# Patient Record
Sex: FEMALE | Race: WHITE | URBAN - METROPOLITAN AREA
[De-identification: names, ages, dates, MRNs, and addresses within clinical notes are randomized per-mention and may not be internally consistent; named-entity substitution may affect disease eponyms.]

---

## 2017-12-12 ENCOUNTER — OUTPATIENT (OUTPATIENT)
Dept: OUTPATIENT SERVICES | Facility: HOSPITAL | Age: 22
LOS: 1 days | Discharge: HOME | End: 2017-12-12

## 2017-12-12 DIAGNOSIS — Z00.00 ENCOUNTER FOR GENERAL ADULT MEDICAL EXAMINATION WITHOUT ABNORMAL FINDINGS: ICD-10-CM

## 2018-07-21 ENCOUNTER — OUTPATIENT (OUTPATIENT)
Dept: OUTPATIENT SERVICES | Facility: HOSPITAL | Age: 23
LOS: 1 days | Discharge: HOME | End: 2018-07-21

## 2018-07-21 DIAGNOSIS — N63.20 UNSPECIFIED LUMP IN THE LEFT BREAST, UNSPECIFIED QUADRANT: ICD-10-CM

## 2018-10-02 PROBLEM — Z00.00 ENCOUNTER FOR PREVENTIVE HEALTH EXAMINATION: Status: ACTIVE | Noted: 2018-10-02

## 2018-10-16 ENCOUNTER — APPOINTMENT (OUTPATIENT)
Dept: SURGERY | Facility: CLINIC | Age: 23
End: 2018-10-16
Payer: COMMERCIAL

## 2018-10-16 VITALS
SYSTOLIC BLOOD PRESSURE: 136 MMHG | WEIGHT: 176 LBS | HEIGHT: 67 IN | BODY MASS INDEX: 27.62 KG/M2 | DIASTOLIC BLOOD PRESSURE: 80 MMHG

## 2018-10-16 DIAGNOSIS — Z86.39 PERSONAL HISTORY OF OTHER ENDOCRINE, NUTRITIONAL AND METABOLIC DISEASE: ICD-10-CM

## 2018-10-16 DIAGNOSIS — Z83.3 FAMILY HISTORY OF DIABETES MELLITUS: ICD-10-CM

## 2018-10-16 DIAGNOSIS — Z78.9 OTHER SPECIFIED HEALTH STATUS: ICD-10-CM

## 2018-10-16 PROCEDURE — 99202 OFFICE O/P NEW SF 15 MIN: CPT

## 2018-10-19 PROBLEM — Z78.9 CAFFEINE USE: Status: ACTIVE | Noted: 2018-10-16

## 2018-10-19 PROBLEM — Z86.39 HISTORY OF HYPERTHYROIDISM: Status: RESOLVED | Noted: 2018-10-16 | Resolved: 2018-10-19

## 2018-10-19 PROBLEM — Z86.39 HISTORY OF DIABETES MELLITUS: Status: RESOLVED | Noted: 2018-10-16 | Resolved: 2018-10-19

## 2018-10-19 PROBLEM — Z83.3 FAMILY HISTORY OF DIABETES MELLITUS: Status: ACTIVE | Noted: 2018-10-16

## 2018-10-19 RX ORDER — PROPRANOLOL HYDROCHLORIDE 10 MG/1
10 TABLET ORAL
Refills: 0 | Status: ACTIVE | COMMUNITY

## 2018-10-19 RX ORDER — FINASTERIDE 5 MG/1
5 TABLET, FILM COATED ORAL
Refills: 0 | Status: ACTIVE | COMMUNITY

## 2018-10-19 RX ORDER — PREDNISONE 10 MG/1
10 TABLET ORAL
Refills: 0 | Status: ACTIVE | COMMUNITY

## 2018-10-19 RX ORDER — METHIMAZOLE 10 MG/1
10 TABLET ORAL
Refills: 0 | Status: ACTIVE | COMMUNITY

## 2018-10-19 RX ORDER — METFORMIN HYDROCHLORIDE 500 MG/1
500 TABLET, COATED ORAL
Refills: 0 | Status: ACTIVE | COMMUNITY

## 2018-10-23 ENCOUNTER — OUTPATIENT (OUTPATIENT)
Dept: OUTPATIENT SERVICES | Facility: HOSPITAL | Age: 23
LOS: 1 days | Discharge: HOME | End: 2018-10-23

## 2018-10-23 ENCOUNTER — MESSAGE (OUTPATIENT)
Age: 23
End: 2018-10-23

## 2018-10-23 DIAGNOSIS — N63.20 UNSPECIFIED LUMP IN THE LEFT BREAST, UNSPECIFIED QUADRANT: ICD-10-CM

## 2018-10-29 ENCOUNTER — OUTPATIENT (OUTPATIENT)
Dept: OUTPATIENT SERVICES | Facility: HOSPITAL | Age: 23
LOS: 1 days | Discharge: HOME | End: 2018-10-29

## 2018-10-29 DIAGNOSIS — R07.81 PLEURODYNIA: ICD-10-CM

## 2018-12-04 ENCOUNTER — APPOINTMENT (OUTPATIENT)
Dept: SURGERY | Facility: CLINIC | Age: 23
End: 2018-12-04
Payer: COMMERCIAL

## 2018-12-04 VITALS
BODY MASS INDEX: 28.09 KG/M2 | DIASTOLIC BLOOD PRESSURE: 70 MMHG | SYSTOLIC BLOOD PRESSURE: 120 MMHG | WEIGHT: 179 LBS | HEIGHT: 67 IN

## 2018-12-04 DIAGNOSIS — N63.20 UNSPECIFIED LUMP IN THE LEFT BREAST, UNSPECIFIED QUADRANT: ICD-10-CM

## 2018-12-04 PROCEDURE — 99213 OFFICE O/P EST LOW 20 MIN: CPT

## 2018-12-05 ENCOUNTER — APPOINTMENT (OUTPATIENT)
Dept: OTOLARYNGOLOGY | Facility: CLINIC | Age: 23
End: 2018-12-05
Payer: COMMERCIAL

## 2018-12-05 PROCEDURE — 99245 OFF/OP CONSLTJ NEW/EST HI 55: CPT

## 2018-12-10 PROBLEM — N63.20 LEFT BREAST LUMP: Status: ACTIVE | Noted: 2018-10-16

## 2019-03-19 ENCOUNTER — APPOINTMENT (OUTPATIENT)
Dept: OTOLARYNGOLOGY | Facility: HOSPITAL | Age: 24
End: 2019-03-19

## 2019-04-25 ENCOUNTER — OUTPATIENT (OUTPATIENT)
Dept: OUTPATIENT SERVICES | Facility: HOSPITAL | Age: 24
LOS: 1 days | Discharge: HOME | End: 2019-04-25
Payer: COMMERCIAL

## 2019-04-25 DIAGNOSIS — E06.3 AUTOIMMUNE THYROIDITIS: ICD-10-CM

## 2019-04-25 PROCEDURE — 76536 US EXAM OF HEAD AND NECK: CPT | Mod: 26

## 2020-07-29 ENCOUNTER — OUTPATIENT (OUTPATIENT)
Dept: OUTPATIENT SERVICES | Facility: HOSPITAL | Age: 25
LOS: 1 days | Discharge: HOME | End: 2020-07-29
Payer: COMMERCIAL

## 2020-07-29 DIAGNOSIS — N63.20 UNSPECIFIED LUMP IN THE LEFT BREAST, UNSPECIFIED QUADRANT: ICD-10-CM

## 2020-07-29 PROCEDURE — 76642 ULTRASOUND BREAST LIMITED: CPT | Mod: 26,LT

## 2021-07-14 ENCOUNTER — OUTPATIENT (OUTPATIENT)
Dept: OUTPATIENT SERVICES | Facility: HOSPITAL | Age: 26
LOS: 1 days | Discharge: HOME | End: 2021-07-14
Payer: COMMERCIAL

## 2021-07-14 DIAGNOSIS — E06.3 AUTOIMMUNE THYROIDITIS: ICD-10-CM

## 2021-07-14 PROCEDURE — 76536 US EXAM OF HEAD AND NECK: CPT | Mod: 26

## 2021-08-11 ENCOUNTER — TRANSCRIPTION ENCOUNTER (OUTPATIENT)
Age: 26
End: 2021-08-11

## 2021-09-29 ENCOUNTER — APPOINTMENT (OUTPATIENT)
Dept: OBGYN | Facility: CLINIC | Age: 26
End: 2021-09-29
Payer: COMMERCIAL

## 2021-09-29 VITALS
HEART RATE: 78 BPM | DIASTOLIC BLOOD PRESSURE: 82 MMHG | HEIGHT: 67 IN | TEMPERATURE: 97.9 F | WEIGHT: 200 LBS | SYSTOLIC BLOOD PRESSURE: 128 MMHG | BODY MASS INDEX: 31.39 KG/M2

## 2021-09-29 DIAGNOSIS — Z01.419 ENCOUNTER FOR GYNECOLOGICAL EXAMINATION (GENERAL) (ROUTINE) W/OUT ABNORMAL FINDINGS: ICD-10-CM

## 2021-09-29 DIAGNOSIS — Z78.9 OTHER SPECIFIED HEALTH STATUS: ICD-10-CM

## 2021-09-29 DIAGNOSIS — Z86.39 PERSONAL HISTORY OF OTHER ENDOCRINE, NUTRITIONAL AND METABOLIC DISEASE: ICD-10-CM

## 2021-09-29 DIAGNOSIS — Z86.018 PERSONAL HISTORY OF OTHER BENIGN NEOPLASM: ICD-10-CM

## 2021-09-29 DIAGNOSIS — Z82.49 FAMILY HISTORY OF ISCHEMIC HEART DISEASE AND OTHER DISEASES OF THE CIRCULATORY SYSTEM: ICD-10-CM

## 2021-09-29 PROCEDURE — 99395 PREV VISIT EST AGE 18-39: CPT

## 2021-09-29 NOTE — PHYSICAL EXAM
[Appropriately responsive] : appropriately responsive [Alert] : alert [No Acute Distress] : no acute distress [No Lymphadenopathy] : no lymphadenopathy [Soft] : soft [Non-tender] : non-tender [Non-distended] : non-distended [No HSM] : No HSM [No Lesions] : no lesions [No Mass] : no mass [Oriented x3] : oriented x3 [Examination Of The Breasts] : a normal appearance [No Discharge] : no discharge [No Masses] : no breast masses were palpable [Labia Majora] : normal [Labia Minora] : normal [No Bleeding] : There was no active vaginal bleeding [Normal] : normal [Uterine Adnexae] : normal [FreeTextEntry6] : limited single digit exam , patient is a virgin , pediatric speculum used, but poor visualization still

## 2021-09-29 NOTE — DISCUSSION/SUMMARY
[FreeTextEntry1] : 26 yo p0 for annual Exam, menorrhagia, dysmenorrhea\par pap hpv\par  pan meds prn \par breast surgeon  after excision advised no f/up needed in 2021 \par (  [facility] )\par \par

## 2021-09-29 NOTE — HISTORY OF PRESENT ILLNESS
[Patient reported PAP Smear was normal] : Patient reported PAP Smear was normal [Y] : Patient reports abnormal menses [N] : Patient is not sexually active [Menarche Age: ____] : age at menarche was [unfilled] [No] : Patient does not have concerns regarding sex [Never active] : never active [TextBox_4] : gynhx\par  no fibroids\par no cyst , no std \par Gardasil vaccine\par 13/reg/7\par \par  [PapSmeardate] : 2019 [MensesFreq] : 28 [MensesLength] : 7 [PGHxTotal] : 0 [FreeTextEntry1] : 9-6-2026

## 2021-09-30 ENCOUNTER — TRANSCRIPTION ENCOUNTER (OUTPATIENT)
Age: 26
End: 2021-09-30

## 2021-10-05 LAB — HPV HIGH+LOW RISK DNA PNL CVX: NOT DETECTED

## 2021-10-08 LAB — CYTOLOGY CVX/VAG DOC THIN PREP: NORMAL

## 2021-10-20 ENCOUNTER — OUTPATIENT (OUTPATIENT)
Dept: OUTPATIENT SERVICES | Facility: HOSPITAL | Age: 26
LOS: 1 days | Discharge: HOME | End: 2021-10-20
Payer: COMMERCIAL

## 2021-10-20 DIAGNOSIS — R63.5 ABNORMAL WEIGHT GAIN: ICD-10-CM

## 2021-10-20 PROCEDURE — 76700 US EXAM ABDOM COMPLETE: CPT | Mod: 26

## 2022-07-13 ENCOUNTER — APPOINTMENT (OUTPATIENT)
Dept: OBGYN | Facility: CLINIC | Age: 27
End: 2022-07-13

## 2022-07-13 VITALS
TEMPERATURE: 98.1 F | DIASTOLIC BLOOD PRESSURE: 80 MMHG | HEART RATE: 78 BPM | HEIGHT: 67 IN | BODY MASS INDEX: 29.03 KG/M2 | SYSTOLIC BLOOD PRESSURE: 118 MMHG | WEIGHT: 185 LBS

## 2022-07-13 DIAGNOSIS — N89.8 OTHER SPECIFIED NONINFLAMMATORY DISORDERS OF VAGINA: ICD-10-CM

## 2022-07-13 DIAGNOSIS — R35.0 FREQUENCY OF MICTURITION: ICD-10-CM

## 2022-07-13 LAB
BILIRUB UR QL STRIP: NORMAL
CLARITY UR: CLEAR
COLLECTION METHOD: NORMAL
GLUCOSE UR-MCNC: NORMAL
HCG UR QL: 0.2 EU/DL
HGB UR QL STRIP.AUTO: NORMAL
KETONES UR-MCNC: ABNORMAL
LEUKOCYTE ESTERASE UR QL STRIP: ABNORMAL
NITRITE UR QL STRIP: NORMAL
PH UR STRIP: 5.5
PROT UR STRIP-MCNC: NORMAL
SP GR UR STRIP: 1.03

## 2022-07-13 PROCEDURE — 99213 OFFICE O/P EST LOW 20 MIN: CPT | Mod: 25

## 2022-07-13 PROCEDURE — 81003 URINALYSIS AUTO W/O SCOPE: CPT | Mod: QW

## 2022-07-13 RX ORDER — METRONIDAZOLE 7.5 MG/G
0.75 GEL VAGINAL
Qty: 1 | Refills: 1 | Status: ACTIVE | COMMUNITY
Start: 2022-07-13 | End: 1900-01-01

## 2022-07-13 RX ORDER — FLUCONAZOLE 150 MG/1
150 TABLET ORAL DAILY
Qty: 2 | Refills: 1 | Status: ACTIVE | COMMUNITY
Start: 2022-07-13 | End: 1900-01-01

## 2022-07-13 NOTE — DISCUSSION/SUMMARY
[FreeTextEntry1] : 25 yo p0 r/o BV and yeast infection, r/o UTI\par vag cx\par gc/chl\par urcx\par MetroGel 5 days\par Fluconazole 150 po\par

## 2022-07-13 NOTE — HISTORY OF PRESENT ILLNESS
[FreeTextEntry1] : 25 yo p0 lmp 7/8 Patient is here for evaluation vaginal burning ,  discharge , vaginal discomfort for last 5-6 days.\par Some discomfort with urination on and off, no blood in the urine or fever, some urinary frequency.\par \par Patient is on metformin and losing weight.  She is off propranolol, thyroid is stable.\par Patient does not want birth control due to breast mass that is being monitored.

## 2022-07-13 NOTE — PHYSICAL EXAM
[Labia Majora] : normal [Labia Minora] : normal [Normal] : normal [Uterine Adnexae] : normal [Discharge] : a  ~M vaginal discharge was present [Moderate] : moderate [Juan Manuel] : yellow [Curds] : curd-like

## 2022-07-18 LAB
A VAGINAE DNA VAG QL NAA+PROBE: NORMAL
BVAB2 DNA VAG QL NAA+PROBE: NORMAL
C KRUSEI DNA VAG QL NAA+PROBE: NEGATIVE
C KRUSEI DNA VAG QL NAA+PROBE: POSITIVE
MEGA1 DNA VAG QL NAA+PROBE: NORMAL
T VAGINALIS RRNA SPEC QL NAA+PROBE: NEGATIVE

## 2023-04-29 ENCOUNTER — EMERGENCY (EMERGENCY)
Facility: HOSPITAL | Age: 28
LOS: 0 days | Discharge: HOME | End: 2023-04-29
Attending: EMERGENCY MEDICINE
Payer: COMMERCIAL

## 2023-04-29 VITALS
HEIGHT: 67 IN | WEIGHT: 179.02 LBS | TEMPERATURE: 98 F | DIASTOLIC BLOOD PRESSURE: 77 MMHG | OXYGEN SATURATION: 98 % | SYSTOLIC BLOOD PRESSURE: 125 MMHG | RESPIRATION RATE: 18 BRPM | HEART RATE: 73 BPM

## 2023-04-29 VITALS
SYSTOLIC BLOOD PRESSURE: 125 MMHG | DIASTOLIC BLOOD PRESSURE: 80 MMHG | HEART RATE: 64 BPM | TEMPERATURE: 97 F | RESPIRATION RATE: 18 BRPM | OXYGEN SATURATION: 98 %

## 2023-04-29 DIAGNOSIS — R55 SYNCOPE AND COLLAPSE: ICD-10-CM
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
## 2023-04-29 DIAGNOSIS — S09.90XA UNSPECIFIED INJURY OF HEAD, INITIAL ENCOUNTER: ICD-10-CM
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
## 2023-04-29 DIAGNOSIS — R42 DIZZINESS AND GIDDINESS: ICD-10-CM
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
## 2023-04-29 DIAGNOSIS — S01.01XA LACERATION WITHOUT FOREIGN BODY OF SCALP, INITIAL ENCOUNTER: ICD-10-CM

## 2023-04-29 DIAGNOSIS — E05.90 THYROTOXICOSIS, UNSPECIFIED WITHOUT THYROTOXIC CRISIS OR STORM: ICD-10-CM

## 2023-04-29 DIAGNOSIS — R51.9 HEADACHE, UNSPECIFIED: ICD-10-CM

## 2023-04-29 DIAGNOSIS — E03.9 HYPOTHYROIDISM, UNSPECIFIED: ICD-10-CM

## 2023-04-29 LAB
ALBUMIN SERPL ELPH-MCNC: 4.8 G/DL — SIGNIFICANT CHANGE UP (ref 3.5–5.2)
ALP SERPL-CCNC: 66 U/L — SIGNIFICANT CHANGE UP (ref 30–115)
ALT FLD-CCNC: 7 U/L — SIGNIFICANT CHANGE UP (ref 0–41)
ANION GAP SERPL CALC-SCNC: 12 MMOL/L — SIGNIFICANT CHANGE UP (ref 7–14)
AST SERPL-CCNC: 10 U/L — SIGNIFICANT CHANGE UP (ref 0–41)
BASOPHILS # BLD AUTO: 0.14 K/UL — SIGNIFICANT CHANGE UP (ref 0–0.2)
BASOPHILS NFR BLD AUTO: 0.8 % — SIGNIFICANT CHANGE UP (ref 0–1)
BILIRUB SERPL-MCNC: 0.4 MG/DL — SIGNIFICANT CHANGE UP (ref 0.2–1.2)
BUN SERPL-MCNC: 19 MG/DL — SIGNIFICANT CHANGE UP (ref 10–20)
CALCIUM SERPL-MCNC: 10 MG/DL — SIGNIFICANT CHANGE UP (ref 8.4–10.4)
CHLORIDE SERPL-SCNC: 105 MMOL/L — SIGNIFICANT CHANGE UP (ref 98–110)
CO2 SERPL-SCNC: 26 MMOL/L — SIGNIFICANT CHANGE UP (ref 17–32)
CREAT SERPL-MCNC: 0.9 MG/DL — SIGNIFICANT CHANGE UP (ref 0.7–1.5)
EGFR: 91 ML/MIN/1.73M2 — SIGNIFICANT CHANGE UP
EOSINOPHIL # BLD AUTO: 0.38 K/UL — SIGNIFICANT CHANGE UP (ref 0–0.7)
EOSINOPHIL NFR BLD AUTO: 2.1 % — SIGNIFICANT CHANGE UP (ref 0–8)
GLUCOSE SERPL-MCNC: 79 MG/DL — SIGNIFICANT CHANGE UP (ref 70–99)
HCT VFR BLD CALC: 41.8 % — SIGNIFICANT CHANGE UP (ref 37–47)
HGB BLD-MCNC: 13.6 G/DL — SIGNIFICANT CHANGE UP (ref 12–16)
IMM GRANULOCYTES NFR BLD AUTO: 0.8 % — HIGH (ref 0.1–0.3)
LYMPHOCYTES # BLD AUTO: 27.4 % — SIGNIFICANT CHANGE UP (ref 20.5–51.1)
LYMPHOCYTES # BLD AUTO: 4.91 K/UL — HIGH (ref 1.2–3.4)
MCHC RBC-ENTMCNC: 27.1 PG — SIGNIFICANT CHANGE UP (ref 27–31)
MCHC RBC-ENTMCNC: 32.5 G/DL — SIGNIFICANT CHANGE UP (ref 32–37)
MCV RBC AUTO: 83.4 FL — SIGNIFICANT CHANGE UP (ref 81–99)
MONOCYTES # BLD AUTO: 1.11 K/UL — HIGH (ref 0.1–0.6)
MONOCYTES NFR BLD AUTO: 6.2 % — SIGNIFICANT CHANGE UP (ref 1.7–9.3)
NEUTROPHILS # BLD AUTO: 11.26 K/UL — HIGH (ref 1.4–6.5)
NEUTROPHILS NFR BLD AUTO: 62.7 % — SIGNIFICANT CHANGE UP (ref 42.2–75.2)
NRBC # BLD: 0 /100 WBCS — SIGNIFICANT CHANGE UP (ref 0–0)
PLATELET # BLD AUTO: 352 K/UL — SIGNIFICANT CHANGE UP (ref 130–400)
PMV BLD: 11 FL — HIGH (ref 7.4–10.4)
POTASSIUM SERPL-MCNC: 4.2 MMOL/L — SIGNIFICANT CHANGE UP (ref 3.5–5)
POTASSIUM SERPL-SCNC: 4.2 MMOL/L — SIGNIFICANT CHANGE UP (ref 3.5–5)
PROT SERPL-MCNC: 7.6 G/DL — SIGNIFICANT CHANGE UP (ref 6–8)
RBC # BLD: 5.01 M/UL — SIGNIFICANT CHANGE UP (ref 4.2–5.4)
RBC # FLD: 12.8 % — SIGNIFICANT CHANGE UP (ref 11.5–14.5)
SODIUM SERPL-SCNC: 143 MMOL/L — SIGNIFICANT CHANGE UP (ref 135–146)
WBC # BLD: 17.94 K/UL — HIGH (ref 4.8–10.8)
WBC # FLD AUTO: 17.94 K/UL — HIGH (ref 4.8–10.8)

## 2023-04-29 PROCEDURE — 70450 CT HEAD/BRAIN W/O DYE: CPT | Mod: MA

## 2023-04-29 PROCEDURE — 70450 CT HEAD/BRAIN W/O DYE: CPT | Mod: 26,MA

## 2023-04-29 PROCEDURE — 99285 EMERGENCY DEPT VISIT HI MDM: CPT | Mod: 25

## 2023-04-29 PROCEDURE — 12001 RPR S/N/AX/GEN/TRNK 2.5CM/<: CPT

## 2023-04-29 PROCEDURE — 85025 COMPLETE CBC W/AUTO DIFF WBC: CPT

## 2023-04-29 PROCEDURE — 93005 ELECTROCARDIOGRAM TRACING: CPT

## 2023-04-29 PROCEDURE — 36415 COLL VENOUS BLD VENIPUNCTURE: CPT

## 2023-04-29 PROCEDURE — 93010 ELECTROCARDIOGRAM REPORT: CPT

## 2023-04-29 PROCEDURE — 80053 COMPREHEN METABOLIC PANEL: CPT

## 2023-04-29 RX ORDER — SODIUM CHLORIDE 9 MG/ML
1000 INJECTION, SOLUTION INTRAVENOUS ONCE
Refills: 0 | Status: COMPLETED | OUTPATIENT
Start: 2023-04-29 | End: 2023-04-29

## 2023-04-29 RX ORDER — ACETAMINOPHEN 500 MG
975 TABLET ORAL ONCE
Refills: 0 | Status: COMPLETED | OUTPATIENT
Start: 2023-04-29 | End: 2023-04-29

## 2023-04-29 RX ADMIN — SODIUM CHLORIDE 1000 MILLILITER(S): 9 INJECTION, SOLUTION INTRAVENOUS at 03:35

## 2023-04-29 RX ADMIN — Medication 975 MILLIGRAM(S): at 06:38

## 2023-04-29 NOTE — ED PROVIDER NOTE - PROVIDER TOKENS
PROVIDER:[TOKEN:[[id number]],FOLLOWUP:[1-3 Days],ESTABLISHEDPATIENT:[T]],PROVIDER:[TOKEN:[[id number]],FOLLOWUP:[1-3 Days],ESTABLISHEDPATIENT:[T]]

## 2023-04-29 NOTE — ED PROVIDER NOTE - CLINICAL SUMMARY MEDICAL DECISION MAKING FREE TEXT BOX
Patient evaluated for syncopal event, sustained a scalp laceration which was repaired without issue in ED.  Labs reviewed, EKG sinus, no evidence of ischemia.  Patient well-appearing and feels well in ED, ambulating around at baseline.  No additional complaints.  All labs reviewed with patient, advised to have follow-up CBC, follow-up with PMD closely as well as cardiology follow-up and patient agreed.  Patient states she she has followed with Dr. Jacob in past.  Wound care and staple removal instruction given as well as close head injury precautions.  Strict return precautions advised and patient verbalized understanding.

## 2023-04-29 NOTE — ED PROVIDER NOTE - NSFOLLOWUPINSTRUCTIONS_ED_ALL_ED_FT
FOLLOW UP WITH YOUR DOCTOR THIS WEEK FOR REEVALUATION AND REPEAT CBC. ADVISE CARDIOLOGY FOLLOW UP AS WELL.    STAPLE REMOVAL IN 10 DAYS    RETURN TO ED IMMEDIATELY WITH ANY WORSENING SYMPTOMS, CHEST PAIN, SHORTNESS OF BREATH, ABDOMINAL PAIN, FEVERS, WEAKNESS, DIZZINESS, PERSISTENT OR SEVERE HEADACHE OR ANY OTHER CONCERNS.     Syncope    Syncope is when you temporarily lose consciousness, also called fainting or passing out. It is caused by a sudden decrease in blood flow to the brain. Even though most causes of syncope are not dangerous, syncope can possibly be a sign of a serious medical problem. Signs that you may be about to faint include feeling dizzy, lightheaded, nausea, visual changes, or cold/clammy skin. Do not drive, operate heavy machinery, or play sports until your health care provider says it is okay.    SEEK IMMEDIATE MEDICAL CARE IF YOU HAVE ANY OF THE FOLLOWING SYMPTOMS: severe headache, pain in your chest/abdomen/back, bleeding from your mouth or rectum, palpitations, shortness of breath, pain with breathing, seizure, confusion, or trouble walking.     Closed Head Injury    A closed head injury is an injury to your head that may or may not involve a traumatic brain injury (TBI). Symptoms of TBI can be short or long lasting and include headache, dizziness, interference with memory or speech, fatigue, confusion, changes in sleep, mood changes, nausea, depression/anxiety, and dulling of senses. Make sure to obtain proper rest which includes getting plenty of sleep, avoiding excessive visual stimulation, and avoiding activities that may cause physical or mental stress. Avoid any situation where there is potential for another head injury, including sports.    SEEK IMMEDIATE MEDICAL CARE IF YOU HAVE ANY OF THE FOLLOWING SYMPTOMS: unusual drowsiness, vomiting, severe dizziness, seizures, lightheadedness, muscular weakness, different pupil sizes, visual changes, or clear or bloody discharge from your ears or nose.     Laceration    A laceration is a cut that goes through all of the layers of the skin and into the tissue that is right under the skin. Some lacerations heal on their own. Others need to be closed with skin adhesive strips, skin glue, stitches (sutures), or staples. Proper laceration care minimizes the risk of infection and helps the laceration to heal better.  If non-absorbable stitches or staples have been placed, they must be taken out within the time frame instructed by your healthcare provider.    SEEK IMMEDIATE MEDICAL CARE IF YOU HAVE ANY OF THE FOLLOWING SYMPTOMS: swelling around the wound, worsening pain, drainage from the wound, red streaking going away from your wound, inability to move finger or toe near the laceration, or discoloration of skin near the laceration.

## 2023-04-29 NOTE — ED ADULT NURSE NOTE - HOW OFTEN DO YOU HAVE A DRINK CONTAINING ALCOHOL?

## 2023-04-29 NOTE — ED PROVIDER NOTE - PHYSICAL EXAMINATION
VITAL SIGNS: noted  CONSTITUTIONAL: Well-developed; well-nourished; in no acute distress  HEAD: Normocephalic; ~1.5 cm superficial laceration to occipital scalp, no active bleeding, no swelling    EYES: PERRL, EOM intact; conjunctiva and sclera clear  ENT: No nasal discharge; no hemotympanum, neg Battles sign, airway clear. MMM  NECK: Supple; non tender.  Trachea midline  CARD: S1, S2 normal; no murmurs, gallops, or rubs. Regular rate and rhythm  RESP: CTAB/L, no wheezes, rales or rhonchi  ABD: Normal bowel sounds; soft; non-distended; non-tender; no CVA tenderness  EXT: Normal ROM. No calf tenderness or edema. Distal pulses intact  NEURO: Alert, oriented. Grossly unremarkable. No focal deficits  SKIN: Skin exam is warm and dry, no acute rash  MS: No midline spinal tenderness

## 2023-04-29 NOTE — ED PROVIDER NOTE - CARE PLAN
1 Principal Discharge DX:	Syncope  Secondary Diagnosis:	Closed head injury  Secondary Diagnosis:	Scalp laceration

## 2023-04-29 NOTE — ED ADULT NURSE NOTE - OBJECTIVE STATEMENT
" I was feeling dizzy and passed out and fell, hit the back of my head on the IV pole while at work.". Pt has lac to back of head

## 2023-04-29 NOTE — ED ADULT TRIAGE NOTE - CHIEF COMPLAINT QUOTE
" I was feeling dizzy and passed out and fell, hit the back of my head on the IV pole while at work."

## 2023-04-29 NOTE — ED PROVIDER NOTE - PATIENT PORTAL LINK FT
You can access the FollowMyHealth Patient Portal offered by [facility] by registering at the following website: [email or web address]. By joining [email or web address]’s FollowMyHealth portal, you will also be able to view your health information using other applications (apps) compatible with our system.

## 2023-04-29 NOTE — ED PROVIDER NOTE - OBJECTIVE STATEMENT
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  Patient was out for just several seconds per staff report.  No seizure-like activity; no urinary incontinence, tongue biting.  Patient denies any preceding symptoms such as chest pain, palpitations, headache, dizziness, ataxia, focal weakness or paresthesias.  Had 1 previous episode of syncope after a dental procedure. Denies any exertional dyspnea, fevers, chills, cough.  Patient reports slight dull headache at area of laceration but no additional complaints.  Tetanus up-to-date per history.  PMD is Dr. Doss.

## 2023-04-29 NOTE — ED PROVIDER NOTE - CARE PROVIDER_API CALL
Travis Jacob)  Cardiology; Internal Medicine; Nuclear Cardiology  [address] [place] [address]  Phone: [phone]  Fax: [phone]  Established Patient  Follow Up Time: 1-3 Days    NIGEL MORA  Pediatrics  [address] [place] [address]  Phone: [phone]  Fax: [phone]  Established Patient  Follow Up Time: 1-3 Days

## 2024-02-18 ENCOUNTER — NON-APPOINTMENT (OUTPATIENT)
Age: 29
End: 2024-02-18

## 2024-07-20 ENCOUNTER — NON-APPOINTMENT (OUTPATIENT)
Age: 29
End: 2024-07-20

## 2024-09-03 PROBLEM — E03.9 HYPOTHYROIDISM, UNSPECIFIED: Chronic | Status: ACTIVE | Noted: 2023-04-29

## 2024-09-03 PROBLEM — E05.90 THYROTOXICOSIS, UNSPECIFIED WITHOUT THYROTOXIC CRISIS OR STORM: Chronic | Status: ACTIVE | Noted: 2023-04-29

## 2024-10-02 ENCOUNTER — LABORATORY RESULT (OUTPATIENT)
Age: 29
End: 2024-10-02

## 2024-10-03 ENCOUNTER — APPOINTMENT (OUTPATIENT)
Dept: OBGYN | Facility: CLINIC | Age: 29
End: 2024-10-03
Payer: COMMERCIAL

## 2024-10-03 VITALS
WEIGHT: 158 LBS | DIASTOLIC BLOOD PRESSURE: 73 MMHG | HEIGHT: 67 IN | SYSTOLIC BLOOD PRESSURE: 110 MMHG | HEART RATE: 68 BPM | BODY MASS INDEX: 24.8 KG/M2

## 2024-10-03 DIAGNOSIS — Z01.419 ENCOUNTER FOR GYNECOLOGICAL EXAMINATION (GENERAL) (ROUTINE) W/OUT ABNORMAL FINDINGS: ICD-10-CM

## 2024-10-03 DIAGNOSIS — N89.8 OTHER SPECIFIED NONINFLAMMATORY DISORDERS OF VAGINA: ICD-10-CM

## 2024-10-03 DIAGNOSIS — Z11.3 ENCOUNTER FOR SCREENING FOR INFECTIONS WITH A PREDOMINANTLY SEXUAL MODE OF TRANSMISSION: ICD-10-CM

## 2024-10-03 PROCEDURE — 99395 PREV VISIT EST AGE 18-39: CPT

## 2024-10-03 RX ORDER — CLINDAMYCIN PHOSPHATE 100 MG/5G
2 CREAM VAGINAL DAILY
Qty: 1 | Refills: 1 | Status: ACTIVE | COMMUNITY
Start: 2024-10-03 | End: 1900-01-01

## 2024-10-03 RX ORDER — FLUCONAZOLE 150 MG/1
150 TABLET ORAL
Qty: 1 | Refills: 1 | Status: ACTIVE | COMMUNITY
Start: 2024-10-03 | End: 1900-01-01

## 2024-10-03 NOTE — HISTORY OF PRESENT ILLNESS
[Patient reported PAP Smear was normal] : Patient reported PAP Smear was normal [N] : Patient reports normal menses [Papeardate] : 9-2021 [LMPDate] : 9- [MensesFreq] : 28 [MensesLength] : 3-5 [MensesAmount] : moderate to light [FreeTextEntry1] : 29 yo p0 lmp 9/18 for annual , no complaints.  Patient complains of vaginal discharge increased yellow and cell odor no itching in the past several weeks. 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

## 2024-10-03 NOTE — DISCUSSION/SUMMARY
[FreeTextEntry1] : 27 YO P0 ANNUAL , STD TESTING, VAGINAL DYSCHARGE PAP HPV VAG CX CLINDESSE 2% DIFLUCAN

## 2024-10-03 NOTE — PHYSICAL EXAM
[Appropriately responsive] : appropriately responsive [Alert] : alert [No Acute Distress] : no acute distress [No Lymphadenopathy] : no lymphadenopathy [Soft] : soft [Non-tender] : non-tender [Non-distended] : non-distended [No HSM] : No HSM [No Lesions] : no lesions [No Mass] : no mass [Oriented x3] : oriented x3 [Examination Of The Breasts] : a normal appearance [No Discharge] : no discharge [No Masses] : no breast masses were palpable [Labia Majora] : normal [Labia Minora] : normal [Discharge] : a  ~M vaginal discharge was present [Normal] : normal [Uterine Adnexae] : normal [FreeTextEntry4] : yellow vaginal discharge

## 2024-10-07 LAB
C TRACH RRNA SPEC QL NAA+PROBE: NOT DETECTED
N GONORRHOEA RRNA SPEC QL NAA+PROBE: NOT DETECTED
SOURCE AMPLIFICATION: NORMAL
SOURCE AMPLIFICATION: NORMAL
T VAGINALIS RRNA SPEC QL NAA+PROBE: NOT DETECTED

## 2024-10-08 ENCOUNTER — NON-APPOINTMENT (OUTPATIENT)
Age: 29
End: 2024-10-08

## 2024-10-09 LAB
A VAGINAE DNA VAG QL NAA+PROBE: NORMAL
BVAB2 DNA VAG QL NAA+PROBE: NORMAL
C KRUSEI DNA VAG QL NAA+PROBE: NEGATIVE
C TRACH RRNA SPEC QL NAA+PROBE: NEGATIVE
CANDIDA DNA VAG QL NAA+PROBE: NEGATIVE
CYTOLOGY CVX/VAG DOC THIN PREP: NORMAL
HPV HIGH+LOW RISK DNA PNL CVX: DETECTED
MEGA1 DNA VAG QL NAA+PROBE: NORMAL
N GONORRHOEA RRNA SPEC QL NAA+PROBE: NEGATIVE
T VAGINALIS RRNA SPEC QL NAA+PROBE: NEGATIVE

## 2024-10-30 ENCOUNTER — NON-APPOINTMENT (OUTPATIENT)
Age: 29
End: 2024-10-30

## 2024-10-30 ENCOUNTER — EMERGENCY (EMERGENCY)
Facility: HOSPITAL | Age: 29
LOS: 0 days | Discharge: HOME | End: 2024-10-30
Attending: STUDENT IN AN ORGANIZED HEALTH CARE EDUCATION/TRAINING PROGRAM
Payer: COMMERCIAL

## 2024-10-30 VITALS
RESPIRATION RATE: 18 BRPM | DIASTOLIC BLOOD PRESSURE: 79 MMHG | WEIGHT: 145.95 LBS | SYSTOLIC BLOOD PRESSURE: 135 MMHG | TEMPERATURE: 97 F | HEART RATE: 96 BPM | HEIGHT: 67 IN | OXYGEN SATURATION: 98 %

## 2024-10-30 LAB
ALBUMIN SERPL ELPH-MCNC: 4.4 G/DL — SIGNIFICANT CHANGE UP (ref 3.5–5.2)
ALP SERPL-CCNC: 39 U/L — SIGNIFICANT CHANGE UP (ref 30–115)
ALT FLD-CCNC: 7 U/L — SIGNIFICANT CHANGE UP (ref 0–41)
ANION GAP SERPL CALC-SCNC: 12 MMOL/L — SIGNIFICANT CHANGE UP (ref 7–14)
APPEARANCE UR: ABNORMAL
AST SERPL-CCNC: 11 U/L — SIGNIFICANT CHANGE UP (ref 0–41)
BACTERIA # UR AUTO: NEGATIVE /HPF — SIGNIFICANT CHANGE UP
BASOPHILS # BLD AUTO: 0.06 K/UL — SIGNIFICANT CHANGE UP (ref 0–0.2)
BASOPHILS NFR BLD AUTO: 0.9 % — SIGNIFICANT CHANGE UP (ref 0–1)
BILIRUB SERPL-MCNC: 0.9 MG/DL — SIGNIFICANT CHANGE UP (ref 0.2–1.2)
BILIRUB UR-MCNC: NEGATIVE — SIGNIFICANT CHANGE UP
BLD GP AB SCN SERPL QL: SIGNIFICANT CHANGE UP
BUN SERPL-MCNC: 6 MG/DL — LOW (ref 10–20)
CALCIUM SERPL-MCNC: 8.7 MG/DL — SIGNIFICANT CHANGE UP (ref 8.4–10.5)
CAST: 7 /LPF — HIGH (ref 0–4)
CHLORIDE SERPL-SCNC: 103 MMOL/L — SIGNIFICANT CHANGE UP (ref 98–110)
CO2 SERPL-SCNC: 20 MMOL/L — SIGNIFICANT CHANGE UP (ref 17–32)
COD CRY URNS QL: PRESENT
COLOR SPEC: SIGNIFICANT CHANGE UP
CREAT SERPL-MCNC: 0.6 MG/DL — LOW (ref 0.7–1.5)
DIFF PNL FLD: NEGATIVE — SIGNIFICANT CHANGE UP
EGFR: 126 ML/MIN/1.73M2 — SIGNIFICANT CHANGE UP
EOSINOPHIL # BLD AUTO: 0.19 K/UL — SIGNIFICANT CHANGE UP (ref 0–0.7)
EOSINOPHIL NFR BLD AUTO: 3 % — SIGNIFICANT CHANGE UP (ref 0–8)
GLUCOSE SERPL-MCNC: 78 MG/DL — SIGNIFICANT CHANGE UP (ref 70–99)
GLUCOSE UR QL: NEGATIVE MG/DL — SIGNIFICANT CHANGE UP
HCG SERPL-ACNC: HIGH MIU/ML
HCT VFR BLD CALC: 33.2 % — LOW (ref 37–47)
HGB BLD-MCNC: 11.7 G/DL — LOW (ref 12–16)
IMM GRANULOCYTES NFR BLD AUTO: 0.3 % — SIGNIFICANT CHANGE UP (ref 0.1–0.3)
KETONES UR-MCNC: >=160 MG/DL
LEUKOCYTE ESTERASE UR-ACNC: ABNORMAL
LIDOCAIN IGE QN: 51 U/L — SIGNIFICANT CHANGE UP (ref 7–60)
LYMPHOCYTES # BLD AUTO: 1.48 K/UL — SIGNIFICANT CHANGE UP (ref 1.2–3.4)
LYMPHOCYTES # BLD AUTO: 23.1 % — SIGNIFICANT CHANGE UP (ref 20.5–51.1)
MCHC RBC-ENTMCNC: 29.8 PG — SIGNIFICANT CHANGE UP (ref 27–31)
MCHC RBC-ENTMCNC: 35.2 G/DL — SIGNIFICANT CHANGE UP (ref 32–37)
MCV RBC AUTO: 84.5 FL — SIGNIFICANT CHANGE UP (ref 81–99)
MONOCYTES # BLD AUTO: 0.39 K/UL — SIGNIFICANT CHANGE UP (ref 0.1–0.6)
MONOCYTES NFR BLD AUTO: 6.1 % — SIGNIFICANT CHANGE UP (ref 1.7–9.3)
MUCOUS THREADS # UR AUTO: PRESENT
NEUTROPHILS # BLD AUTO: 4.26 K/UL — SIGNIFICANT CHANGE UP (ref 1.4–6.5)
NEUTROPHILS NFR BLD AUTO: 66.6 % — SIGNIFICANT CHANGE UP (ref 42.2–75.2)
NITRITE UR-MCNC: NEGATIVE — SIGNIFICANT CHANGE UP
NRBC # BLD: 0 /100 WBCS — SIGNIFICANT CHANGE UP (ref 0–0)
PH UR: 6 — SIGNIFICANT CHANGE UP (ref 5–8)
PLATELET # BLD AUTO: 209 K/UL — SIGNIFICANT CHANGE UP (ref 130–400)
PMV BLD: 11 FL — HIGH (ref 7.4–10.4)
POTASSIUM SERPL-MCNC: 3.6 MMOL/L — SIGNIFICANT CHANGE UP (ref 3.5–5)
POTASSIUM SERPL-SCNC: 3.6 MMOL/L — SIGNIFICANT CHANGE UP (ref 3.5–5)
PROT SERPL-MCNC: 6.5 G/DL — SIGNIFICANT CHANGE UP (ref 6–8)
PROT UR-MCNC: SIGNIFICANT CHANGE UP MG/DL
RBC # BLD: 3.93 M/UL — LOW (ref 4.2–5.4)
RBC # FLD: 12.8 % — SIGNIFICANT CHANGE UP (ref 11.5–14.5)
RBC CASTS # UR COMP ASSIST: 7 /HPF — HIGH (ref 0–4)
SODIUM SERPL-SCNC: 135 MMOL/L — SIGNIFICANT CHANGE UP (ref 135–146)
SP GR SPEC: >1.03 — HIGH (ref 1–1.03)
SQUAMOUS # UR AUTO: 4 /HPF — SIGNIFICANT CHANGE UP (ref 0–5)
UROBILINOGEN FLD QL: 1 MG/DL — SIGNIFICANT CHANGE UP (ref 0.2–1)
WBC # BLD: 6.4 K/UL — SIGNIFICANT CHANGE UP (ref 4.8–10.8)
WBC # FLD AUTO: 6.4 K/UL — SIGNIFICANT CHANGE UP (ref 4.8–10.8)
WBC UR QL: 2 /HPF — SIGNIFICANT CHANGE UP (ref 0–5)

## 2024-10-30 PROCEDURE — 84702 CHORIONIC GONADOTROPIN TEST: CPT

## 2024-10-30 PROCEDURE — 81001 URINALYSIS AUTO W/SCOPE: CPT

## 2024-10-30 PROCEDURE — 76830 TRANSVAGINAL US NON-OB: CPT

## 2024-10-30 PROCEDURE — 36000 PLACE NEEDLE IN VEIN: CPT

## 2024-10-30 PROCEDURE — 85025 COMPLETE CBC W/AUTO DIFF WBC: CPT

## 2024-10-30 PROCEDURE — 36415 COLL VENOUS BLD VENIPUNCTURE: CPT

## 2024-10-30 PROCEDURE — 99284 EMERGENCY DEPT VISIT MOD MDM: CPT

## 2024-10-30 PROCEDURE — 86901 BLOOD TYPING SEROLOGIC RH(D): CPT

## 2024-10-30 PROCEDURE — 80053 COMPREHEN METABOLIC PANEL: CPT

## 2024-10-30 PROCEDURE — 99284 EMERGENCY DEPT VISIT MOD MDM: CPT | Mod: 25

## 2024-10-30 PROCEDURE — 86900 BLOOD TYPING SEROLOGIC ABO: CPT

## 2024-10-30 PROCEDURE — 86850 RBC ANTIBODY SCREEN: CPT

## 2024-10-30 PROCEDURE — 76830 TRANSVAGINAL US NON-OB: CPT | Mod: 26

## 2024-10-30 PROCEDURE — 83690 ASSAY OF LIPASE: CPT

## 2024-10-30 RX ORDER — CLOTRIMAZOLE 100 MG
1 TABLET VAGINAL
Qty: 7 | Refills: 0
Start: 2024-10-30 | End: 2024-11-05

## 2024-10-30 RX ORDER — SODIUM CHLORIDE 0.9 % (FLUSH) 0.9 %
1000 SYRINGE (ML) INJECTION ONCE
Refills: 0 | Status: COMPLETED | OUTPATIENT
Start: 2024-10-30 | End: 2024-10-30

## 2024-10-30 RX ADMIN — Medication 1000 MILLILITER(S): at 22:02

## 2024-10-30 NOTE — ED PROVIDER NOTE - OBJECTIVE STATEMENT
29-year-old female currently 6 weeks pregnant presents for pelvic cramping for the past several days.  Patient denies any vaginal bleeding.  Patient states to have a yellow vaginal discharge.  LMP 9/18. pt denies any other symptoms including fevers, chill, headache, recent illness/travel, cough, abdominal pain, chest pain, or SOB.

## 2024-10-30 NOTE — ED PROVIDER NOTE - PATIENT PORTAL LINK FT
You can access the FollowMyHealth Patient Portal offered by [facility] by registering at the following website: [email or web address]. By joining [employer]’s FollowMyHealth portal, you will also be able to view your health information using other applications (apps) compatible with our system.

## 2024-10-30 NOTE — ED PROVIDER NOTE - CLINICAL SUMMARY MEDICAL DECISION MAKING FREE TEXT BOX
29-year-old female currently 6 weeks pregnant presents for pelvic cramping for the past several days associated with vaginal discharge. denies fevers. denies vaginal bleeding. no n/v/d. sexually active only with one partner. exam as documented. labs reviewed. TVUS with viable IUP. will empirically treat for yeast infection. close follow up with gyn recommended.

## 2024-10-30 NOTE — ED PROVIDER NOTE - NSFOLLOWUPINSTRUCTIONS_ED_ALL_ED_FT
Please follow up with your OBGYN and primary care physician within 24-72 hours and return immediately if symptoms worsen.    Vaginal Yeast infection, Adult    Vaginal yeast infection is a condition that causes soreness, swelling, and redness (inflammation) of the vagina. It also causes vaginal discharge. This is a common condition. Some women get this infection frequently.     CAUSES  This condition is caused by a change in the normal balance of the yeast (candida) and bacteria that live in the vagina. This change causes an overgrowth of yeast, which causes the inflammation.    RISK FACTORS  This condition is more likely to develop in:    Women who take antibiotic medicines.  Women who have diabetes.  Women who take birth control pills.  Women who are pregnant.  Women who douche often.  Women who have a weak defense (immune) system.  Women who have been taking steroid medicines for a long time.  Women who frequently wear tight clothing.    SYMPTOMS  Symptoms of this condition include:    White, thick vaginal discharge.  Swelling, itching, redness, and irritation of the vagina. The lips of the vagina (vulva) may be affected as well.  Pain or a burning feeling while urinating.  Pain during sex.    DIAGNOSIS  This condition is diagnosed with a medical history and physical exam. This will include a pelvic exam. Your health care provider will examine a sample of your vaginal discharge under a microscope. Your health care provider may send this sample for testing to confirm the diagnosis.    TREATMENT  This condition is treated with medicine. Medicines may be over-the-counter or prescription. You may be told to use one or more of the following:    Medicine that is taken orally.  Medicine that is applied as a cream.  Medicine that is inserted directly into the vagina (suppository).    HOME CARE INSTRUCTIONS  Take or apply over-the-counter and prescription medicines only as told by your health care provider.  Do not have sex until your health care provider has approved. Tell your sex partner that you have a yeast infection. That person should go to his or her health care provider if he or she develops symptoms.  Do not wear tight clothes, such as pantyhose or tight pants.  Avoid using tampons until your health care provider approves.  Eat more yogurt. This may help to keep your yeast infection from returning.  Try taking a sitz bath to help with discomfort. This is a warm water bath that is taken while you are sitting down. The water should only come up to your hips and should cover your buttocks. Do this 3–4 times per day or as told by your health care provider.  Do not douche.  Wear breathable, cotton underwear.  If you have diabetes, keep your blood sugar levels under control.    SEEK MEDICAL CARE IF:  You have a fever.  Your symptoms go away and then return.  Your symptoms do not get better with treatment.  Your symptoms get worse.  You have new symptoms.  You develop blisters in or around your vagina.  You have blood coming from your vagina and it is not your menstrual period.  You develop pain in your abdomen.    ADDITIONAL NOTES AND INSTRUCTIONS    Please follow up with your Primary MD in 24-48 hr.  Seek immediate medical care for any new/worsening signs or symptoms.

## 2024-10-30 NOTE — ED PROVIDER NOTE - PHYSICAL EXAMINATION
Gen: NAD, AOx3  Head: NCAT  HEENT: PERRL, oral mucosa moist, normal conjunctiva, oropharynx clear without exudate or erythema  Lung: CTAB, no respiratory distress, no wheezing, rales, rhonchi  CV: normal s1/s2, rrr, Normal perfusion, pulses 2+ throughout  Abd: soft, NTND, no CVA tenderness  Genitourinary: no pelvic tenderness, no edema/erythema/lesion, yellow vaginal discharge, no CMT/adnexal tenderness   MSK: No edema, no visible deformities, full range of motion in all 4 extremities  Neuro: CN II-XII grossly intact, No focal neurologic deficits  Skin: No rash   Psych: normal affect

## 2024-11-11 DIAGNOSIS — Z32.01 ENCOUNTER FOR PREGNANCY TEST, RESULT POSITIVE: ICD-10-CM

## 2024-11-13 ENCOUNTER — EMERGENCY (EMERGENCY)
Facility: HOSPITAL | Age: 29
LOS: 0 days | Discharge: HOME | End: 2024-11-13
Attending: EMERGENCY MEDICINE
Payer: COMMERCIAL

## 2024-11-13 VITALS
RESPIRATION RATE: 18 BRPM | HEIGHT: 67 IN | SYSTOLIC BLOOD PRESSURE: 123 MMHG | WEIGHT: 143.96 LBS | DIASTOLIC BLOOD PRESSURE: 77 MMHG | TEMPERATURE: 98 F | HEART RATE: 76 BPM | OXYGEN SATURATION: 100 %

## 2024-11-13 DIAGNOSIS — O23.41 UNSPECIFIED INFECTION OF URINARY TRACT IN PREGNANCY, FIRST TRIMESTER: ICD-10-CM

## 2024-11-13 DIAGNOSIS — O99.891 OTHER SPECIFIED DISEASES AND CONDITIONS COMPLICATING PREGNANCY: ICD-10-CM

## 2024-11-13 DIAGNOSIS — O20.0 THREATENED ABORTION: ICD-10-CM

## 2024-11-13 LAB
ALBUMIN SERPL ELPH-MCNC: 4 G/DL — SIGNIFICANT CHANGE UP (ref 3.5–5.2)
ALP SERPL-CCNC: 34 U/L — SIGNIFICANT CHANGE UP (ref 30–115)
ALT FLD-CCNC: 6 U/L — SIGNIFICANT CHANGE UP (ref 0–41)
ANION GAP SERPL CALC-SCNC: 11 MMOL/L — SIGNIFICANT CHANGE UP (ref 7–14)
APPEARANCE UR: CLEAR — SIGNIFICANT CHANGE UP
AST SERPL-CCNC: 11 U/L — SIGNIFICANT CHANGE UP (ref 0–41)
BACTERIA # UR AUTO: NEGATIVE /HPF — SIGNIFICANT CHANGE UP
BASOPHILS # BLD AUTO: 0.07 K/UL — SIGNIFICANT CHANGE UP (ref 0–0.2)
BASOPHILS NFR BLD AUTO: 1 % — SIGNIFICANT CHANGE UP (ref 0–1)
BILIRUB SERPL-MCNC: 0.3 MG/DL — SIGNIFICANT CHANGE UP (ref 0.2–1.2)
BILIRUB UR-MCNC: NEGATIVE — SIGNIFICANT CHANGE UP
BUN SERPL-MCNC: 12 MG/DL — SIGNIFICANT CHANGE UP (ref 10–20)
CALCIUM SERPL-MCNC: 9 MG/DL — SIGNIFICANT CHANGE UP (ref 8.4–10.5)
CAST: 0 /LPF — SIGNIFICANT CHANGE UP (ref 0–4)
CHLORIDE SERPL-SCNC: 104 MMOL/L — SIGNIFICANT CHANGE UP (ref 98–110)
CO2 SERPL-SCNC: 20 MMOL/L — SIGNIFICANT CHANGE UP (ref 17–32)
COLOR SPEC: YELLOW — SIGNIFICANT CHANGE UP
CREAT SERPL-MCNC: 0.6 MG/DL — LOW (ref 0.7–1.5)
DIFF PNL FLD: ABNORMAL
EGFR: 126 ML/MIN/1.73M2 — SIGNIFICANT CHANGE UP
EOSINOPHIL # BLD AUTO: 0.31 K/UL — SIGNIFICANT CHANGE UP (ref 0–0.7)
EOSINOPHIL NFR BLD AUTO: 4.3 % — SIGNIFICANT CHANGE UP (ref 0–8)
GLUCOSE SERPL-MCNC: 78 MG/DL — SIGNIFICANT CHANGE UP (ref 70–99)
GLUCOSE UR QL: NEGATIVE MG/DL — SIGNIFICANT CHANGE UP
HCG SERPL-ACNC: HIGH MIU/ML
HCT VFR BLD CALC: 33.6 % — LOW (ref 37–47)
HGB BLD-MCNC: 11.7 G/DL — LOW (ref 12–16)
IMM GRANULOCYTES NFR BLD AUTO: 0.3 % — SIGNIFICANT CHANGE UP (ref 0.1–0.3)
KETONES UR-MCNC: NEGATIVE MG/DL — SIGNIFICANT CHANGE UP
LEUKOCYTE ESTERASE UR-ACNC: ABNORMAL
LYMPHOCYTES # BLD AUTO: 1.75 K/UL — SIGNIFICANT CHANGE UP (ref 1.2–3.4)
LYMPHOCYTES # BLD AUTO: 24.2 % — SIGNIFICANT CHANGE UP (ref 20.5–51.1)
MCHC RBC-ENTMCNC: 29.8 PG — SIGNIFICANT CHANGE UP (ref 27–31)
MCHC RBC-ENTMCNC: 34.8 G/DL — SIGNIFICANT CHANGE UP (ref 32–37)
MCV RBC AUTO: 85.5 FL — SIGNIFICANT CHANGE UP (ref 81–99)
MONOCYTES # BLD AUTO: 0.43 K/UL — SIGNIFICANT CHANGE UP (ref 0.1–0.6)
MONOCYTES NFR BLD AUTO: 5.9 % — SIGNIFICANT CHANGE UP (ref 1.7–9.3)
NEUTROPHILS # BLD AUTO: 4.65 K/UL — SIGNIFICANT CHANGE UP (ref 1.4–6.5)
NEUTROPHILS NFR BLD AUTO: 64.3 % — SIGNIFICANT CHANGE UP (ref 42.2–75.2)
NITRITE UR-MCNC: NEGATIVE — SIGNIFICANT CHANGE UP
NRBC # BLD: 0 /100 WBCS — SIGNIFICANT CHANGE UP (ref 0–0)
PH UR: 6.5 — SIGNIFICANT CHANGE UP (ref 5–8)
PLATELET # BLD AUTO: 201 K/UL — SIGNIFICANT CHANGE UP (ref 130–400)
PMV BLD: 11.2 FL — HIGH (ref 7.4–10.4)
POTASSIUM SERPL-MCNC: 4 MMOL/L — SIGNIFICANT CHANGE UP (ref 3.5–5)
POTASSIUM SERPL-SCNC: 4 MMOL/L — SIGNIFICANT CHANGE UP (ref 3.5–5)
PROT SERPL-MCNC: 6.3 G/DL — SIGNIFICANT CHANGE UP (ref 6–8)
PROT UR-MCNC: NEGATIVE MG/DL — SIGNIFICANT CHANGE UP
RBC # BLD: 3.93 M/UL — LOW (ref 4.2–5.4)
RBC # FLD: 12.5 % — SIGNIFICANT CHANGE UP (ref 11.5–14.5)
RBC CASTS # UR COMP ASSIST: 1 /HPF — SIGNIFICANT CHANGE UP (ref 0–4)
SODIUM SERPL-SCNC: 135 MMOL/L — SIGNIFICANT CHANGE UP (ref 135–146)
SP GR SPEC: 1.01 — SIGNIFICANT CHANGE UP (ref 1–1.03)
SQUAMOUS # UR AUTO: 3 /HPF — SIGNIFICANT CHANGE UP (ref 0–5)
UROBILINOGEN FLD QL: 0.2 MG/DL — SIGNIFICANT CHANGE UP (ref 0.2–1)
WBC # BLD: 7.23 K/UL — SIGNIFICANT CHANGE UP (ref 4.8–10.8)
WBC # FLD AUTO: 7.23 K/UL — SIGNIFICANT CHANGE UP (ref 4.8–10.8)
WBC UR QL: 21 /HPF — HIGH (ref 0–5)

## 2024-11-13 PROCEDURE — 84702 CHORIONIC GONADOTROPIN TEST: CPT

## 2024-11-13 PROCEDURE — 36415 COLL VENOUS BLD VENIPUNCTURE: CPT

## 2024-11-13 PROCEDURE — 99284 EMERGENCY DEPT VISIT MOD MDM: CPT | Mod: 25

## 2024-11-13 PROCEDURE — 87077 CULTURE AEROBIC IDENTIFY: CPT

## 2024-11-13 PROCEDURE — 85025 COMPLETE CBC W/AUTO DIFF WBC: CPT

## 2024-11-13 PROCEDURE — 87086 URINE CULTURE/COLONY COUNT: CPT

## 2024-11-13 PROCEDURE — 76817 TRANSVAGINAL US OBSTETRIC: CPT

## 2024-11-13 PROCEDURE — 76817 TRANSVAGINAL US OBSTETRIC: CPT | Mod: 26

## 2024-11-13 PROCEDURE — 81001 URINALYSIS AUTO W/SCOPE: CPT

## 2024-11-13 PROCEDURE — 80053 COMPREHEN METABOLIC PANEL: CPT

## 2024-11-13 PROCEDURE — 99284 EMERGENCY DEPT VISIT MOD MDM: CPT

## 2024-11-13 RX ORDER — NITROFURANTOIN 25 MG/5ML
1 SUSPENSION ORAL
Qty: 6 | Refills: 0
Start: 2024-11-13 | End: 2024-11-15

## 2024-11-13 NOTE — ED PROVIDER NOTE - NSFOLLOWUPINSTRUCTIONS_ED_ALL_ED_FT
Threatened Miscarriage: What to Know  You may have vaginal bleeding in the first 20 weeks of pregnancy. If bleeding happens during this time, your health care provider may run certain tests. The condition may be called a threatened miscarriage if the tests show:  That you're still pregnant.  That your unborn baby (fetus) is still growing.  That your baby has a heart beat.  A threatened miscarriage does not mean your pregnancy will end. But it increases your risk for a miscarriage.    What are the causes?  The cause of this condition is usually not known.    What increases the risk?  The following factors may make a pregnant person more likely to have a miscarriage:    Certain medical conditions    Hormone conditions, such as thyroid disease or polycystic ovary syndrome (PCOS).  Diabetes.  Autoimmune disorders.  Infections.  Bleeding disorders.  Obesity.  Lifestyle factors    Smoking, vaping, or using other products with tobacco or nicotine, or being exposed to tobacco smoke.  Drinking alcohol.  Drinking large amounts of caffeine. Ask your provider how much caffeine is safe for you.  Using drugs.  Problems with organs, structures, or the pregnancy    Cervical insufficiency. This is when the cervix opens and thins before pregnancy is at term.  Problems with the lining or tissue in your uterus.  Abnormal growths, called fibroids, in the uterus.  Problems with the baby's genetic material, called chromosomes.  Infection.  Personal or medical history    Injury. This is rare.  Having had a miscarriage before.  Being younger than age 18.  Being older than age 35.  Exposure to harmful substances in the environment.  Using certain medicines.  History of blood clots.  What are the signs or symptoms?  Spotting or bleeding in the vagina. This is with or without cramps or pain.  Mild pain or cramps in your belly.  How is this diagnosed?  A person lying on an exam table. A provider runs an ultrasound device over their belly.  You may have tests to check if you're still pregnant. These tests will be done if you have bleeding, with or without pain, in your belly before the 20th week of pregnancy. These tests include:  Ultrasound.  A physical exam.  Checking your baby's heart beat.  Lab tests, such as blood tests, pee tests, or swabs for infection.  You may be diagnosed with a threatened miscarriage if:  Ultrasound testing shows that you're still pregnant.  Your baby's heart rate is strong.  A physical exam shows that your cervix is closed.  Blood tests confirm that you're still pregnant.  How is this treated?  No treatments have been shown to prevent a threatened miscarriage from going on to a complete miscarriage. However, the right home care is important.    Follow these instructions at home:  Get plenty of rest.  Do not have sex, douche, or put anything in your vagina, such as tampons, until your provider says it's OK.  Do not smoke, vape, or use nicotine or tobacco.  Do not drink alcohol.  Do not use drugs.  Avoid caffeine.  Keep all follow-up prenatal visits. Your provider will advise you on next steps.  Contact a health care provider if:  You have light vaginal bleeding or spotting while pregnant.  You have pain or cramping in your belly.  You have a fever.  Get help right away if:  You have heavy bleeding that soaks through 2 large pads an hour for more than 2 hours.  You have tissue or blood clots coming out of your vagina.  You leak fluid, or you have a gush of fluid from your vagina.  You have very bad cramps or pain in your back or belly.  These symptoms may be an emergency. Call 911 right away.  Do not wait to see if the symptoms will go away.  Do not drive yourself to the hospital.  This information is not intended to replace advice given to you by your health care provider. Make sure you discuss any questions you have with your health care provider.

## 2024-11-13 NOTE — ED ADULT TRIAGE NOTE - WEIGHT IN KG
If you are a smoker, it is important for your health to stop smoking. Please be aware that second hand smoke is also harmful.
65.3

## 2024-11-13 NOTE — ED PROVIDER NOTE - PATIENT PORTAL LINK FT
You can access the FollowMyHealth Patient Portal offered by [facility] by registering at the following website: [email or web address]. By joining [employer]’s FollowMyHealth portal, you will also be able to view your health information using other applications (apps) compatible with our system.

## 2024-11-13 NOTE — ED PROVIDER NOTE - PHYSICAL EXAMINATION
CONSTITUTIONAL: Well-appearing; in no apparent distress.   EYES: PERRL; EOM intact.   CARDIOVASCULAR: Normal S1, S2; no murmurs, rubs, or gallops.   RESPIRATORY: Normal chest excursion with respiration; breath sounds clear and equal bilaterally; no wheezes, rhonchi, or rales.  GI: Normal bowel sounds; non-distended; non-tender; no palpable organomegaly.   MS: No calf swelling and tenderness.  SKIN: Normal for age and race; warm; dry; good turgor; no apparent lesions or exudate.   NEURO/PSYCH: A & O x 4; grossly unremarkable.

## 2024-11-13 NOTE — ED PROVIDER NOTE - OBJECTIVE STATEMENT
29 years old G1, P0, 8 weeks pregnant female, LMP 09/13, present complaint vaginal spotting that noted this evening.  As per patient, she woke up to use the bathroom and noted blood on tissue paper while wiping so she comes to ED for evaluation.  Denies abdominal pain associated vaginal spotting.  Denies recent intercourse, heavy lifting or fall.    Denies fever, chills, chest pain, flank pain, abdominal pain, nausea, vomiting, and urinary symptoms.

## 2024-11-13 NOTE — ED PROVIDER NOTE - CLINICAL SUMMARY MEDICAL DECISION MAKING FREE TEXT BOX
28 yo F,  at 8 weeks GA here for assessment of VB. Patient woke up to use the restroom, noted bleeding upon wiping. No abdominal pain, passage of tissue or clots. No recent trauma, intercourse, heavy lifting.  VS normal, well appearing on exam without active hemorrhaging, soft, NT, ND abdomen.    US with IUP with good FHR, appropriate beta. UA positive, will treat with macrobid.    Advised patient on findings, dx of threatened miscarriage. Need for bleeding precautions pelvic rest, return precautions, follow up.

## 2024-11-14 LAB
CULTURE RESULTS: ABNORMAL
SPECIMEN SOURCE: SIGNIFICANT CHANGE UP

## 2024-11-18 ENCOUNTER — EMERGENCY (EMERGENCY)
Facility: HOSPITAL | Age: 29
LOS: 0 days | Discharge: HOME | End: 2024-11-18
Attending: EMERGENCY MEDICINE
Payer: COMMERCIAL

## 2024-11-18 VITALS
SYSTOLIC BLOOD PRESSURE: 106 MMHG | HEIGHT: 67 IN | WEIGHT: 143.96 LBS | HEART RATE: 83 BPM | OXYGEN SATURATION: 99 % | DIASTOLIC BLOOD PRESSURE: 76 MMHG | TEMPERATURE: 98 F | RESPIRATION RATE: 20 BRPM

## 2024-11-18 DIAGNOSIS — O99.891 OTHER SPECIFIED DISEASES AND CONDITIONS COMPLICATING PREGNANCY: ICD-10-CM

## 2024-11-18 DIAGNOSIS — R55 SYNCOPE AND COLLAPSE: ICD-10-CM

## 2024-11-18 DIAGNOSIS — R42 DIZZINESS AND GIDDINESS: ICD-10-CM

## 2024-11-18 DIAGNOSIS — Z3A.09 9 WEEKS GESTATION OF PREGNANCY: ICD-10-CM

## 2024-11-18 DIAGNOSIS — E03.9 HYPOTHYROIDISM, UNSPECIFIED: ICD-10-CM

## 2024-11-18 LAB
ALBUMIN SERPL ELPH-MCNC: 4.6 G/DL — SIGNIFICANT CHANGE UP (ref 3.5–5.2)
ALP SERPL-CCNC: 41 U/L — SIGNIFICANT CHANGE UP (ref 30–115)
ALT FLD-CCNC: 9 U/L — SIGNIFICANT CHANGE UP (ref 0–41)
ANION GAP SERPL CALC-SCNC: 11 MMOL/L — SIGNIFICANT CHANGE UP (ref 7–14)
AST SERPL-CCNC: 12 U/L — SIGNIFICANT CHANGE UP (ref 0–41)
BASOPHILS # BLD AUTO: 0.06 K/UL — SIGNIFICANT CHANGE UP (ref 0–0.2)
BASOPHILS NFR BLD AUTO: 0.7 % — SIGNIFICANT CHANGE UP (ref 0–1)
BILIRUB SERPL-MCNC: 0.4 MG/DL — SIGNIFICANT CHANGE UP (ref 0.2–1.2)
BUN SERPL-MCNC: 13 MG/DL — SIGNIFICANT CHANGE UP (ref 10–20)
CALCIUM SERPL-MCNC: 9.7 MG/DL — SIGNIFICANT CHANGE UP (ref 8.4–10.4)
CHLORIDE SERPL-SCNC: 103 MMOL/L — SIGNIFICANT CHANGE UP (ref 98–110)
CO2 SERPL-SCNC: 24 MMOL/L — SIGNIFICANT CHANGE UP (ref 17–32)
CREAT SERPL-MCNC: 0.7 MG/DL — SIGNIFICANT CHANGE UP (ref 0.7–1.5)
EGFR: 121 ML/MIN/1.73M2 — SIGNIFICANT CHANGE UP
EOSINOPHIL # BLD AUTO: 0.3 K/UL — SIGNIFICANT CHANGE UP (ref 0–0.7)
EOSINOPHIL NFR BLD AUTO: 3.5 % — SIGNIFICANT CHANGE UP (ref 0–8)
GLUCOSE SERPL-MCNC: 125 MG/DL — HIGH (ref 70–99)
HCT VFR BLD CALC: 36.7 % — LOW (ref 37–47)
HGB BLD-MCNC: 12.6 G/DL — SIGNIFICANT CHANGE UP (ref 12–16)
IMM GRANULOCYTES NFR BLD AUTO: 0.6 % — HIGH (ref 0.1–0.3)
LYMPHOCYTES # BLD AUTO: 1.76 K/UL — SIGNIFICANT CHANGE UP (ref 1.2–3.4)
LYMPHOCYTES # BLD AUTO: 20.3 % — LOW (ref 20.5–51.1)
MCHC RBC-ENTMCNC: 29.7 PG — SIGNIFICANT CHANGE UP (ref 27–31)
MCHC RBC-ENTMCNC: 34.3 G/DL — SIGNIFICANT CHANGE UP (ref 32–37)
MCV RBC AUTO: 86.6 FL — SIGNIFICANT CHANGE UP (ref 81–99)
MONOCYTES # BLD AUTO: 0.52 K/UL — SIGNIFICANT CHANGE UP (ref 0.1–0.6)
MONOCYTES NFR BLD AUTO: 6 % — SIGNIFICANT CHANGE UP (ref 1.7–9.3)
NEUTROPHILS # BLD AUTO: 5.99 K/UL — SIGNIFICANT CHANGE UP (ref 1.4–6.5)
NEUTROPHILS NFR BLD AUTO: 68.9 % — SIGNIFICANT CHANGE UP (ref 42.2–75.2)
NRBC # BLD: 0 /100 WBCS — SIGNIFICANT CHANGE UP (ref 0–0)
PLATELET # BLD AUTO: 211 K/UL — SIGNIFICANT CHANGE UP (ref 130–400)
PMV BLD: 11.7 FL — HIGH (ref 7.4–10.4)
POTASSIUM SERPL-MCNC: 4.4 MMOL/L — SIGNIFICANT CHANGE UP (ref 3.5–5)
POTASSIUM SERPL-SCNC: 4.4 MMOL/L — SIGNIFICANT CHANGE UP (ref 3.5–5)
PROT SERPL-MCNC: 7.3 G/DL — SIGNIFICANT CHANGE UP (ref 6–8)
RBC # BLD: 4.24 M/UL — SIGNIFICANT CHANGE UP (ref 4.2–5.4)
RBC # FLD: 12.8 % — SIGNIFICANT CHANGE UP (ref 11.5–14.5)
SODIUM SERPL-SCNC: 138 MMOL/L — SIGNIFICANT CHANGE UP (ref 135–146)
WBC # BLD: 8.68 K/UL — SIGNIFICANT CHANGE UP (ref 4.8–10.8)
WBC # FLD AUTO: 8.68 K/UL — SIGNIFICANT CHANGE UP (ref 4.8–10.8)

## 2024-11-18 PROCEDURE — 85025 COMPLETE CBC W/AUTO DIFF WBC: CPT

## 2024-11-18 PROCEDURE — 99283 EMERGENCY DEPT VISIT LOW MDM: CPT | Mod: 25

## 2024-11-18 PROCEDURE — 93010 ELECTROCARDIOGRAM REPORT: CPT

## 2024-11-18 PROCEDURE — 93005 ELECTROCARDIOGRAM TRACING: CPT

## 2024-11-18 PROCEDURE — 82962 GLUCOSE BLOOD TEST: CPT

## 2024-11-18 PROCEDURE — 36415 COLL VENOUS BLD VENIPUNCTURE: CPT

## 2024-11-18 PROCEDURE — 99284 EMERGENCY DEPT VISIT MOD MDM: CPT

## 2024-11-18 PROCEDURE — 80053 COMPREHEN METABOLIC PANEL: CPT

## 2024-11-18 PROCEDURE — 36000 PLACE NEEDLE IN VEIN: CPT

## 2024-11-18 RX ORDER — SODIUM CHLORIDE 9 MG/ML
1000 INJECTION, SOLUTION INTRAMUSCULAR; INTRAVENOUS; SUBCUTANEOUS ONCE
Refills: 0 | Status: COMPLETED | OUTPATIENT
Start: 2024-11-18 | End: 2024-11-18

## 2024-11-18 RX ADMIN — SODIUM CHLORIDE 1000 MILLILITER(S): 9 INJECTION, SOLUTION INTRAMUSCULAR; INTRAVENOUS; SUBCUTANEOUS at 20:34

## 2024-11-18 NOTE — ED PROVIDER NOTE - PATIENT PORTAL LINK FT
You can access the FollowMyHealth Patient Portal offered by [facility] by registering at the following website: [email or web address]. By joining [employer]’s FollowMyHealth portal, you will also be able to view your health information using other applications (apps) compatible with our system.

## 2024-11-18 NOTE — ED PROVIDER NOTE - CLINICAL SUMMARY MEDICAL DECISION MAKING FREE TEXT BOX
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 Pt did NOT pass out. States she probably had a vaso vagal episode, got pale and sweaty, and improved when she sat down for a bit. FS is 147. EKG done and ok. No fever/chills/sob/cp/abdomen pain/n/v/d/vaginal bleeding/dysuria/back pain/HA/neck pain. Exam per PA note. Labs ordered and IVFs given. Pt labs ok. Pt felt better after fluids. Pt dc'd to follow up with her OB/GYN and PMD as outpt. Return precautions provided

## 2024-11-18 NOTE — ED PROVIDER NOTE - OBJECTIVE STATEMENT
29 years old  9 weeks pregnant female presenting complaint of lightheadedness and near syncopal while walking during work.  Patient did not loss consciousness.  Sitting down improves her symptoms.  Also had a brief episode of pallor and diaphoresis.  No symptoms now in ED.  Otherwise denies recent illness, fever, chills, chest pain, shortness breath, and urinary symptoms vomiting, diarrhea, vaginal bleeding or discharge.

## 2024-11-18 NOTE — ED PROVIDER NOTE - ATTENDING APP SHARED VISIT CONTRIBUTION OF CARE
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 Pt did NOT pass out. States she probably had a vaso vagal episode, got pale and sweaty, and improved when she sat down for a bit. FS is 147. EKG done and ok. No fever/chills/sob/cp/abdomen pain/n/v/d/vaginal bleeding/dysuria/back pain/HA/neck pain. Exam per PA note. Labs ordered and IVFs given. Pt labs ok. Pt felt better after fluids. Pt dc'd to follow up with her OB/GYN and PMD as outpt. Return precautions provided.     ALL: nkda  Meds: pnv, vit d, synthroid  SH no smoking, no etoh

## 2024-11-20 ENCOUNTER — APPOINTMENT (OUTPATIENT)
Dept: OBGYN | Facility: CLINIC | Age: 29
End: 2024-11-20

## 2025-05-10 NOTE — ED ADULT NURSE NOTE - SUICIDE SCREENING QUESTION 1
No
The patient was seen immediately upon arrival due to a high probability of imminent or life-threatening deterioration secondary to stroke symptoms, which required my direct attention, intervention, and personal management at the bedside. I have personally provided critical care time exclusive of time spent on separately billable procedures. Time includes review of laboratory data, radiology results, discussion with consultants, discussion with the patient's family, and monitoring for potential decompensation.

## 2025-06-02 NOTE — ED ADULT TRIAGE NOTE - IDEAL BODY WEIGHT(KG)
[facility]  CARDIOLOGY [address]  Scheduled Appointment: 06/12/2025    David Ramos  [facility]  ELECTROPH [address]  Scheduled Appointment: 07/10/2025    [facility]  PULED [address]  Scheduled Appointment: 07/17/2025    Deshawn Vidales  [facility]  PULED [address]  Scheduled Appointment: 07/17/2025    Jude Alex  [facility]  CARDIOLOGY [address]  Scheduled Appointment: 07/17/2025     [address] [facility]  CARDIOLOGY [address]  Scheduled Appointment: 06/12/2025    [facility]  TRAUMASURG [address]  Scheduled Appointment: 06/19/2025    David Ramos  [facility]  ELECTROPH [address]  Scheduled Appointment: 07/10/2025    [facility]  PULMMED [address]  Scheduled Appointment: 07/17/2025    Deshawn Vidales  [facility]  PULED [address]  Scheduled Appointment: 07/17/2025    Jude Alex  [facility]  CARDIOLOGY [address]  Scheduled Appointment: 07/17/2025

## 2025-07-15 NOTE — ED ADULT TRIAGE NOTE - SOURCE OF INFORMATION
Detail Level: Detailed Add 1585x Cpt? (Do Not Bill If You Billed For The Procedure Placing The Sutures. This Is An Add-On Code That Must Be Billed With An E/M Visit Code): No Patient